# Patient Record
Sex: MALE | Race: WHITE | NOT HISPANIC OR LATINO | Employment: UNEMPLOYED | URBAN - METROPOLITAN AREA
[De-identification: names, ages, dates, MRNs, and addresses within clinical notes are randomized per-mention and may not be internally consistent; named-entity substitution may affect disease eponyms.]

---

## 2019-01-14 ENCOUNTER — OFFICE VISIT (OUTPATIENT)
Dept: GASTROENTEROLOGY | Facility: MEDICAL CENTER | Age: 69
End: 2019-01-14
Payer: OTHER GOVERNMENT

## 2019-01-14 VITALS
TEMPERATURE: 97.8 F | DIASTOLIC BLOOD PRESSURE: 70 MMHG | HEART RATE: 81 BPM | SYSTOLIC BLOOD PRESSURE: 120 MMHG | WEIGHT: 217 LBS

## 2019-01-14 DIAGNOSIS — K21.9 GASTROESOPHAGEAL REFLUX DISEASE WITHOUT ESOPHAGITIS: ICD-10-CM

## 2019-01-14 DIAGNOSIS — Z12.11 COLON CANCER SCREENING: Primary | ICD-10-CM

## 2019-01-14 PROCEDURE — 99204 OFFICE O/P NEW MOD 45 MIN: CPT | Performed by: INTERNAL MEDICINE

## 2019-01-14 RX ORDER — IPRATROPIUM BROMIDE AND ALBUTEROL SULFATE 2.5; .5 MG/3ML; MG/3ML
2 SOLUTION RESPIRATORY (INHALATION) EVERY 4 HOURS PRN
COMMUNITY
Start: 2015-07-01 | End: 2019-06-24 | Stop reason: ALTCHOICE

## 2019-01-14 RX ORDER — MONTELUKAST SODIUM 10 MG/1
10 TABLET ORAL
COMMUNITY
Start: 2009-08-07

## 2019-01-14 RX ORDER — GUAIFENESIN 200 MG/1
200 TABLET ORAL EVERY 4 HOURS PRN
COMMUNITY
Start: 2009-08-07

## 2019-01-14 RX ORDER — LEVOTHYROXINE SODIUM 0.15 MG/1
150 TABLET ORAL
COMMUNITY
Start: 2018-08-14

## 2019-01-14 RX ORDER — RANITIDINE 150 MG/1
150 CAPSULE ORAL
COMMUNITY
Start: 2014-01-10

## 2019-01-14 NOTE — LETTER
January 15, 2019     Claudean Alter, MD  [address]  [place]  [id number]  [id number]    Patient: Sandy Quinonez   YOB: 1950   Date of Visit: 1/14/2019       Dear Dr Jose Tapia: Thank you for referring Sandy Quinonez to me for evaluation  Below are my notes for this consultation  If you have questions, please do not hesitate to call me  I look forward to following your patient along with you  Sincerely,        Tamy Peraza, DO        CC: No Recipients  Alcides Winters  1/14/2019 11:08 AM  Sign at close encounter  [facility] - Outpatient Consultation  Sandy Quinonez 68 y o  male MRN: [id number]  Encounter: [id number]          ASSESSMENT AND PLAN:     Sandy Quinonez is a 68 y o  male here as a referral from Dr Claudean Alter for a colon cancer screening  1  Colon cancer screening  Last colonoscopy was over 10 years ago and it was normal  He is due for another one so I will schedule him for a colonoscopy  Risks and benefits of the procedure were discussed  Risks include but aren't limited to bleeding, perforation, missed lesion, and infection  Patient is agreeable to the procedure  Bowel prep instructions given  2  Gastroesophageal reflux disease without esophagitis  His reflux symptoms are well controlled with Zantac 150 mg daily  He has been on it for several years  Given his long-standing GERD, I will schedule him for an EGD to assess to Orellana's esophagus  Risks and benefits of the procedure were discussed  Risks include but aren't limited to bleeding, perforation, missed lesion, and infection  Patient is agreeable to the procedure     ______________________________________________________________________    HPI:  Sandy Quinonez is a 68 y o  male here as a referral from Dr Claudean Alter for a colon cancer screening  He denies having alarm symptoms today besides one minor instance of rectal bleeding (one drop) 3 weeks ago   He has no family history of color cancer  He takes Zantac 150 mg at night for his heartburn; he has been on it for years  He has never been a smoker and he only drinks occasionally  REVIEW OF SYSTEMS:    CONSTITUTIONAL: Denies any fever, chills, rigors, and weight loss  HEENT: No earache or tinnitus  Denies hearing loss or visual disturbances  CARDIOVASCULAR: No chest pain or palpitations  RESPIRATORY: Denies any cough, hemoptysis, shortness of breath or dyspnea on exertion  GASTROINTESTINAL: As noted in the History of Present Illness  GENITOURINARY: No problems with urination  Denies any hematuria or dysuria  NEUROLOGIC: No dizziness or vertigo, denies headaches  MUSCULOSKELETAL: Denies any muscle or joint pain  SKIN: Denies skin rashes or itching  ENDOCRINE: Denies excessive thirst  Denies intolerance to heat or cold  PSYCHOSOCIAL: Denies depression or anxiety  Denies any recent memory loss  Historical Information   History reviewed  No pertinent past medical history  Past Surgical History:   Procedure Laterality Date    COLONOSCOPY       Social History   History   Alcohol Use    Yes     Comment: occasionally     History   Drug Use No     History   Smoking Status    Never Smoker   Smokeless Tobacco    Never Used     History reviewed  No pertinent family history  Meds/Allergies       Current Outpatient Prescriptions:     fluticasone-salmeterol (ADVAIR) 250-50 mcg/dose inhaler    guaiFENesin 200 MG tablet    ipratropium-albuterol (DUO-NEB) 0 5-2 5 mg/3 mL nebulizer solution    levothyroxine 150 mcg tablet    montelukast (SINGULAIR) 10 mg tablet    ranitidine (ZANTAC) 150 MG capsule    Allergies   Allergen Reactions    Bee Venom Anaphylaxis     Anaphylaxis           Objective     There were no vitals taken for this visit  There is no height or weight on file to calculate BMI  PHYSICAL EXAM:      General Appearance:   Alert, cooperative, no distress   HEENT:   Normocephalic, atraumatic, anicteric      Neck:  Supple, symmetrical, trachea midline   Lungs:   Clear to auscultation bilaterally; no rales, rhonchi or wheezing; respirations unlabored    Heart[de-identified]   Regular rate and rhythm; no murmur, rub, or gallop  Abdomen:   Soft, non-tender, non-distended; normal bowel sounds; no masses, no organomegaly    Genitalia:   Deferred    Rectal:   Deferred    Extremities:  No cyanosis, clubbing or edema    Pulses:  2+ and symmetric    Skin:  No jaundice, rashes, or lesions    Lymph nodes:  No palpable cervical lymphadenopathy        Lab Results:   No visits with results within 1 Day(s) from this visit  Latest known visit with results is:   No results found for any previous visit  Radiology Results:   No results found  Attestation:   By signing my name below, cayla Davis that this documentation has been prepared under the direction and in the presence of Lou Mathis DO  Electronically Signed: David Guillaume  1/14/19       I, Lou Mathis, personally performed the services described in this documentation  All medical record entries made by the papiibpete were at my direction and in my presence  I have reviewed the chart and discharge instructions and agree that the record reflects my personal performance and is accurate and complete     Lou Mathis DO  1/14/19

## 2019-01-14 NOTE — PATIENT INSTRUCTIONS
The patient is scheduled at [place] [address] for a colon/egd on March 25, 2018  Suprep instructions were gone over with by the MA  Plenvu prep is being used  No follow is needed

## 2019-01-14 NOTE — PROGRESS NOTES
[facility] - Outpatient Consultation  Angelique Hinds 68 y o  male MRN: [id number]  Encounter: [id number]          ASSESSMENT AND PLAN:     Angelique Hinds is a 68 y o  male here as a referral from Dr Theresa Macias for a colon cancer screening  1  Colon cancer screening  Last colonoscopy was over 10 years ago and it was normal  He is due for another one so I will schedule him for a colonoscopy  Risks and benefits of the procedure were discussed  Risks include but aren't limited to bleeding, perforation, missed lesion, and infection  Patient is agreeable to the procedure  Bowel prep instructions given  2  Gastroesophageal reflux disease without esophagitis  His reflux symptoms are well controlled with Zantac 150 mg daily  He has been on it for several years  Given his long-standing GERD, I will schedule him for an EGD to assess to Orellana's esophagus  Risks and benefits of the procedure were discussed  Risks include but aren't limited to bleeding, perforation, missed lesion, and infection  Patient is agreeable to the procedure  Follow up annually due to chronic GERD     ______________________________________________________________________    HPI:  Angelique Hinds is a 68 y o  male here as a referral from Dr Theresa Macias for a colon cancer screening  He denies having alarm symptoms today besides one minor instance of rectal bleeding (one drop) 3 weeks ago  He has no family history of color cancer  He takes Zantac 150 mg at night for his heartburn; he has been on it for years  He has never been a smoker and he only drinks occasionally  REVIEW OF SYSTEMS:    CONSTITUTIONAL: Denies any fever, chills, rigors, and weight loss  HEENT: No earache or tinnitus  Denies hearing loss or visual disturbances  CARDIOVASCULAR: No chest pain or palpitations  RESPIRATORY: Denies any cough, hemoptysis, shortness of breath or dyspnea on exertion    GASTROINTESTINAL: As noted in the History of Present Illness  GENITOURINARY: No problems with urination  Denies any hematuria or dysuria  NEUROLOGIC: No dizziness or vertigo, denies headaches  MUSCULOSKELETAL: Denies any muscle or joint pain  SKIN: Denies skin rashes or itching  ENDOCRINE: Denies excessive thirst  Denies intolerance to heat or cold  PSYCHOSOCIAL: Denies depression or anxiety  Denies any recent memory loss  Historical Information   History reviewed  No pertinent past medical history  Past Surgical History:   Procedure Laterality Date    COLONOSCOPY       Social History   History   Alcohol Use    Yes     Comment: occasionally     History   Drug Use No     History   Smoking Status    Never Smoker   Smokeless Tobacco    Never Used     History reviewed  No pertinent family history  Meds/Allergies       Current Outpatient Prescriptions:     fluticasone-salmeterol (ADVAIR) 250-50 mcg/dose inhaler    guaiFENesin 200 MG tablet    ipratropium-albuterol (DUO-NEB) 0 5-2 5 mg/3 mL nebulizer solution    levothyroxine 150 mcg tablet    montelukast (SINGULAIR) 10 mg tablet    ranitidine (ZANTAC) 150 MG capsule    Allergies   Allergen Reactions    Bee Venom Anaphylaxis     Anaphylaxis           Objective     There were no vitals taken for this visit  There is no height or weight on file to calculate BMI  PHYSICAL EXAM:      General Appearance:   Alert, cooperative, no distress   HEENT:   Normocephalic, atraumatic, anicteric      Neck:  Supple, symmetrical, trachea midline   Lungs:   Clear to auscultation bilaterally; no rales, rhonchi or wheezing; respirations unlabored    Heart[de-identified]   Regular rate and rhythm; no murmur, rub, or gallop     Abdomen:   Soft, non-tender, non-distended; normal bowel sounds; no masses, no organomegaly    Genitalia:   Deferred    Rectal:   Deferred    Extremities:  No cyanosis, clubbing or edema    Pulses:  2+ and symmetric    Skin:  No jaundice, rashes, or lesions    Lymph nodes:  No palpable cervical lymphadenopathy        Lab Results:   No visits with results within 1 Day(s) from this visit  Latest known visit with results is:   No results found for any previous visit  Radiology Results:   No results found  Attestation:   By signing my name below, Lucius Rehman, attest that this documentation has been prepared under the direction and in the presence of Tamy Peraza DO  Electronically Signed: Zenaida Lofton  1/14/19       I, Tamy Peraza, personally performed the services described in this documentation  All medical record entries made by the zenaida were at my direction and in my presence  I have reviewed the chart and discharge instructions and agree that the record reflects my personal performance and is accurate and complete     Tamy Peraza DO  1/14/19

## 2019-03-22 ENCOUNTER — ANESTHESIA EVENT (OUTPATIENT)
Dept: GASTROENTEROLOGY | Facility: MEDICAL CENTER | Age: 69
End: 2019-03-22
Payer: OTHER GOVERNMENT

## 2019-03-25 ENCOUNTER — ANESTHESIA (OUTPATIENT)
Dept: GASTROENTEROLOGY | Facility: MEDICAL CENTER | Age: 69
End: 2019-03-25
Payer: OTHER GOVERNMENT

## 2019-03-25 ENCOUNTER — HOSPITAL ENCOUNTER (OUTPATIENT)
Facility: MEDICAL CENTER | Age: 69
Setting detail: OUTPATIENT SURGERY
Discharge: HOME | End: 2019-03-25
Attending: INTERNAL MEDICINE | Admitting: INTERNAL MEDICINE
Payer: OTHER GOVERNMENT

## 2019-03-25 VITALS
DIASTOLIC BLOOD PRESSURE: 73 MMHG | HEART RATE: 81 BPM | SYSTOLIC BLOOD PRESSURE: 109 MMHG | OXYGEN SATURATION: 97 % | TEMPERATURE: 98.7 F | HEIGHT: 72 IN | WEIGHT: 217 LBS | RESPIRATION RATE: 16 BRPM | BODY MASS INDEX: 29.39 KG/M2

## 2019-03-25 DIAGNOSIS — Z12.11 COLON CANCER SCREENING: ICD-10-CM

## 2019-03-25 DIAGNOSIS — K21.9 GASTROESOPHAGEAL REFLUX DISEASE WITHOUT ESOPHAGITIS: ICD-10-CM

## 2019-03-25 DIAGNOSIS — K22.10 EROSIVE ESOPHAGITIS: Primary | ICD-10-CM

## 2019-03-25 PROCEDURE — 43239 EGD BIOPSY SINGLE/MULTIPLE: CPT | Performed by: INTERNAL MEDICINE

## 2019-03-25 PROCEDURE — 88305 TISSUE EXAM BY PATHOLOGIST: CPT | Performed by: PATHOLOGY

## 2019-03-25 PROCEDURE — 45385 COLONOSCOPY W/LESION REMOVAL: CPT | Performed by: INTERNAL MEDICINE

## 2019-03-25 RX ORDER — SODIUM CHLORIDE 9 MG/ML
125 INJECTION, SOLUTION INTRAVENOUS CONTINUOUS
Status: DISCONTINUED | OUTPATIENT
Start: 2019-03-25 | End: 2019-03-25 | Stop reason: HOSPADM

## 2019-03-25 RX ORDER — OMEPRAZOLE 40 MG/1
40 CAPSULE, DELAYED RELEASE ORAL
Qty: 60 CAPSULE | Refills: 3 | Status: SHIPPED | OUTPATIENT
Start: 2019-03-25

## 2019-03-25 RX ORDER — PROPOFOL 10 MG/ML
INJECTION, EMULSION INTRAVENOUS AS NEEDED
Status: DISCONTINUED | OUTPATIENT
Start: 2019-03-25 | End: 2019-03-25 | Stop reason: SURG

## 2019-03-25 RX ADMIN — PROPOFOL 100 MG: 10 INJECTION, EMULSION INTRAVENOUS at 09:45

## 2019-03-25 RX ADMIN — PROPOFOL 100 MG: 10 INJECTION, EMULSION INTRAVENOUS at 09:34

## 2019-03-25 RX ADMIN — SODIUM CHLORIDE 125 ML/HR: 0.9 INJECTION, SOLUTION INTRAVENOUS at 09:05

## 2019-03-25 RX ADMIN — PROPOFOL 100 MG: 10 INJECTION, EMULSION INTRAVENOUS at 09:53

## 2019-03-25 RX ADMIN — PROPOFOL 100 MG: 10 INJECTION, EMULSION INTRAVENOUS at 09:41

## 2019-03-25 RX ADMIN — PROPOFOL 100 MG: 10 INJECTION, EMULSION INTRAVENOUS at 09:37

## 2019-03-25 RX ADMIN — PROPOFOL 100 MG: 10 INJECTION, EMULSION INTRAVENOUS at 10:00

## 2019-03-25 NOTE — ANESTHESIA PREPROCEDURE EVALUATION
Review of Systems/Medical History  Patient summary reviewed  Chart reviewed      Cardiovascular   Pulmonary  Negative pulmonary ROS Smoker ex-smoker  , Asthma ,        GI/Hepatic    GERD well controlled,        Negative  ROS        Endo/Other  Negative endo/other ROS History of thyroid disease , hypothyroidism,      GYN       Hematology  Negative hematology ROS      Musculoskeletal  Negative musculoskeletal ROS        Neurology  Negative neurology ROS      Psychology   Negative psychology ROS              Physical Exam    Airway    Mallampati score: II  TM Distance: <3 FB  Neck ROM: full     Dental       Cardiovascular  Rhythm: regular, Rate: normal,     Pulmonary  Breath sounds clear to auscultation,     Other Findings        Anesthesia Plan  ASA Score- 2     Anesthesia Type- IV sedation with anesthesia with ASA Monitors  Additional Monitors:   Airway Plan:         Plan Factors- Patient instructed to abstain from smoking on day of procedure  Patient did not smoke on day of surgery  Induction- intravenous  Postoperative Plan-     Informed Consent- Anesthetic plan and risks discussed with patient

## 2019-03-25 NOTE — DISCHARGE INSTRUCTIONS
Upper Endoscopy   WHAT YOU NEED TO KNOW:   An upper endoscopy is also called an upper gastrointestinal (GI) endoscopy, or an esophagogastroduodenoscopy (EGD)  You may feel bloated, gassy, or have some abdominal discomfort after your procedure  Your throat may be sore for 24 to 36 hours  You may burp or pass gas from air that is still inside your body  DISCHARGE INSTRUCTIONS:   Call 911 if:   · You have sudden chest pain or trouble breathing  Seek care immediately if:   · You feel dizzy or faint  · You have trouble swallowing  · You have severe throat pain  · Your bowel movements are very dark or black  · Your abdomen is hard and firm and you have severe pain  · You vomit blood  Contact your healthcare provider if:   · You feel full or bloated and cannot burp or pass gas  · You have not had a bowel movement for 3 days after your procedure  · You have neck pain  · You have a fever or chills  · You have nausea or are vomiting  · You have a rash or hives  · You have questions or concerns about your endoscopy  Relieve a sore throat:  Suck on throat lozenges or crushed ice  Gargle with a small amount of warm salt water  Mix 1 teaspoon of salt and 1 cup of warm water to make salt water  Relieve gas and discomfort from bloating:  Lie on your right side with a heating pad on your abdomen  Take short walks to help pass gas  Eat small meals until bloating is relieved  Rest after your procedure:  Do not drive or make important decisions until the day after your procedure  Return to your normal activity as directed  You can usually return to work the day after your procedure  Follow up with your healthcare provider as directed:  Write down your questions so you remember to ask them during your visits  © 2017 [facility] Information is for End User's use only and may not be sold, redistributed or otherwise used for commercial purposes   All illustrations and images included in CareNotes® are the copyrighted property of [employer] M [employer]  or Mika García  The above information is an  only  It is not intended as medical advice for individual conditions or treatments  Talk to your doctor, nurse or pharmacist before following any medical regimen to see if it is safe and effective for you  Colonoscopy   WHAT YOU NEED TO KNOW:   A colonoscopy is a procedure to examine the inside of your colon (intestine) with a scope  Polyps or tissue growths may have been removed during your colonoscopy  It is normal to feel bloated and to have some abdominal discomfort  You should be passing gas  If you have hemorrhoids or you had polyps removed, you may have a small amount of bleeding  DISCHARGE INSTRUCTIONS:   Seek care immediately if:   · You have a large amount of bright red blood in your bowel movements  · Your abdomen is hard and firm and you have severe pain  · You have sudden trouble breathing  Contact your healthcare provider if:   · You develop a rash or hives  · You have a fever within 24 hours of your procedure  · You have not had a bowel movement for 3 days after your procedure  · You have questions or concerns about your condition or care  Activity:   · Do not lift, strain, or run  for 3 days after your procedure  · Rest after your procedure  You have been given medicine to relax you  Do not  drive or make important decisions until the day after your procedure  Return to your normal activity as directed  · Relieve gas and discomfort from bloating  by lying on your right side with a heating pad on your abdomen  You may need to take short walks to help the gas move out  Eat small meals until bloating is relieved  If you had polyps removed: For 7 days after your procedure:  · Do not  take aspirin  · Do not  go on long car rides  Help prevent constipation:   · Eat a variety of healthy foods    Healthy foods include fruit, vegetables, whole-grain breads, low-fat dairy products, beans, lean meat, and fish  Ask if you need to be on a special diet  Your healthcare provider may recommend that you eat high-fiber foods such as cooked beans  Fiber helps you have regular bowel movements  · Drink liquids as directed  Adults should drink between 9 and 13 eight-ounce cups of liquid every day  Ask what amount is best for you  For most people, good liquids to drink are water, juice, and milk  · Exercise as directed  Talk to your healthcare provider about the best exercise plan for you  Exercise can help prevent constipation, decrease your blood pressure and improve your health  Follow up with your healthcare provider as directed:  Write down your questions so you remember to ask them during your visits  © 2017 [facility] Information is for End User's use only and may not be sold, redistributed or otherwise used for commercial purposes  All illustrations and images included in CareNotes® are the copyrighted property of A [employer] A M , Inc  or Mika García  The above information is an  only  It is not intended as medical advice for individual conditions or treatments  Talk to your doctor, nurse or pharmacist before following any medical regimen to see if it is safe and effective for you  Corrosive Esophagitis   WHAT YOU NEED TO KNOW:   Corrosive esophagitis is a condition where your esophagus is damaged by harmful substances  The damage may cause inflammation, ulcers, or scarring  DISCHARGE INSTRUCTIONS:   Medicines:   · Antibiotics:  Antibiotics help treat or prevent an infection in your esophagus  · Steroids: These help decrease inflammation  · Stomach acid medicine: These help decrease irritation from stomach acids  · Antiulcer medicine: These help decrease irritation from stomach acids   They may help increase the protective lining of the esophagus to help it heal     · Take your medicine as directed  Contact your healthcare provider if you think your medicine is not helping or if you have side effects  Tell him or her if you are allergic to any medicine  Keep a list of the medicines, vitamins, and herbs you take  Include the amounts, and when and why you take them  Bring the list or the pill bottles to follow-up visits  Carry your medicine list with you in case of an emergency  Follow up with your healthcare provider as directed: You may need to have another endoscopy to make sure your esophagus is healing  Write down your questions so you remember to ask them during your visits  Nutrition to help your esophagus heal:  You may need to eat foods that are soft and easy to swallow  Some examples are applesauce, bananas, cooked cereal, cottage cheese, eggs, and yogurt  Prevent corrosive esophagitis:   · Sit or stand when you take your medicine:  Do not lie down after you take your pills  Stay in an upright position for 10 to 15 minutes after you take your pills  · Store harmful chemicals in a safe location:  Label bottles with harmful substances, and keep them out of the reach of children  · Ask for other ways to take your medicine: If you have a narrow esophagus, ask if you can take your medicine in liquid form  Ask if you can crush the pill and mix it with liquid to drink  If you must swallow pills, take them 1 at a time  Take each one with at least 4 ounces of liquid  Contact your healthcare provider if:   · You have a fever  · You have pain that does not decrease or go away after you take your pain medicine  · You vomit and cannot keep food or liquids down  · Your stomach feels very full, and you cannot burp or vomit  · You have questions or concerns about your condition or care  Seek care immediately or call 911 if:   · You feel like food or medicine is stuck in your esophagus and it does not go down when you drink water       · Your vomit has blood in it or looks like coffee grounds  · You have sudden chest pain and shortness of breath  · You have black or bloody bowel movements  · Your symptoms are getting worse  © 2017 [address] [facility] Information is for End User's use only and may not be sold, redistributed or otherwise used for commercial purposes  All illustrations and images included in CareNotes® are the copyrighted property of [employer] M [employer] Inc  or Mika García  The above information is an  only  It is not intended as medical advice for individual conditions or treatments  Talk to your doctor, nurse or pharmacist before following any medical regimen to see if it is safe and effective for you  Gastritis   WHAT YOU NEED TO KNOW:   Gastritis is inflammation or irritation of the lining of your stomach  DISCHARGE INSTRUCTIONS:   Call 911 for any of the following:   · You develop chest pain or shortness of breath  Seek care immediately if:   · You vomit blood  · You have black or bloody bowel movements  · You have severe stomach or back pain  Contact your healthcare provider if:   · You have a fever  · You have new or worsening symptoms, even after treatment  · You have questions or concerns about your condition or care  Medicines:   · Medicines  may be given to help treat a bacterial infection or decrease stomach acid  · Take your medicine as directed  Contact your healthcare provider if you think your medicine is not helping or if you have side effects  Tell him or her if you are allergic to any medicine  Keep a list of the medicines, vitamins, and herbs you take  Include the amounts, and when and why you take them  Bring the list or the pill bottles to follow-up visits  Carry your medicine list with you in case of an emergency  Manage or prevent gastritis:   · Do not smoke    Nicotine and other chemicals in cigarettes and cigars can make your symptoms worse and cause lung damage  Ask your healthcare provider for information if you currently smoke and need help to quit  E-cigarettes or smokeless tobacco still contain nicotine  Talk to your healthcare provider before you use these products  · Do not drink alcohol  Alcohol can prevent healing and make your gastritis worse  Talk to your healthcare provider if you need help to stop drinking  · Do not take NSAIDs or aspirin unless directed  These and similar medicines can cause irritation  If your healthcare provider says it is okay to take NSAIDs, take them with food  · Do not eat foods that cause irritation  Foods such as oranges and salsa can cause burning or pain  Eat a variety of healthy foods  Examples include fruits (not citrus), vegetables, low-fat dairy products, beans, whole-grain breads, and lean meats and fish  Try to eat small meals, and drink water with your meals  Do not eat for at least 3 hours before you go to bed  · Find ways to relax and decrease stress  Stress can increase stomach acid and make gastritis worse  Activities such as yoga, meditation, or listening to music can help you relax  Spend time with friends, or do things you enjoy  Follow up with your healthcare provider as directed: You may need ongoing tests or treatment, or referral to a gastroenterologist  Write down your questions so you remember to ask them during your visits  © 2017 [address]  Information is for End User's use only and may not be sold, redistributed or otherwise used for commercial purposes  All illustrations and images included in CareNotes® are the copyrighted property of [employer]  or Mika García  The above information is an  only  It is not intended as medical advice for individual conditions or treatments  Talk to your doctor, nurse or pharmacist before following any medical regimen to see if it is safe and effective for you        Hiatal Hernia   WHAT YOU NEED TO KNOW:   What is a hiatal hernia? A hiatal hernia is a condition that causes part of your stomach to bulge through the hiatus (small opening) in your diaphragm  The part of the stomach may move up and down, or it may get trapped above the diaphragm  What increases my risk for a hiatal hernia? The exact cause of a hiatal hernia is not known  You may have been born with a large hiatus  The following may increase your risk of a hiatal hernia:  · Obesity    · Older age    · Medical conditions such as diverticulosis or esophagitis    · Previous surgery of the esophagus or stomach or trauma such as from a motor vehicle accident  What are the types of hiatal hernia? · Type I (sliding hiatal hernia): A portion of the stomach slides in and out of the hiatus  This type is the most common and usually causes gastroesophageal reflux disease (GERD)  GERD occurs when the esophageal sphincter does not close properly and causes acid reflux  The esophageal sphincter is the lower muscle of the esophagus  · Type II (paraesophageal hiatal hernia):  Type II hiatal hernia forms when a part of the stomach squeezes through the hiatus and lies next to the esophagus  · Type III (combined):  Type III hiatal hernia is a combination of a sliding and a paraesophageal hiatal hernia  · Type IV (complex paraesophageal hiatal hernia): The whole stomach, the small and large bowels, spleen, pancreas, or liver is pushed up into the chest   What are the signs and symptoms of a hiatal hernia? The most common symptom is heartburn  This usually occurs after meals and spreads to your neck, jaw, or shoulder   You may have no signs or symptoms, or you may have any of the following:  · Abdominal pain, especially in the area just above your navel    · Bitter or acid taste in your mouth    · Trouble swallowing    · Coughing or hoarseness    · Chest pain or shortness of breath that occurs after eating    · Frequent burping or hiccups    · Uncomfortable feeling of fullness after eating  How is a hiatal hernia diagnosed? · An upper GI series test  includes x-rays of your esophagus, stomach, and your small intestines  It is also called a barium swallow test  You will be given barium (a chalky liquid) to drink before the pictures are taken  This liquid helps your stomach and intestines show up better on the x-rays  An upper GI series can show if you have an ulcer, a blocked intestine, or other problems  · An endoscopy  uses a scope to see the inside of your digestive tract  A scope is a long, bendable tube with a light on the end of it  A camera may be hooked to the scope to take pictures  How is a hiatal hernia treated? Treatment depends on the type of hiatal hernia you have and on your symptoms  You may not need any treatment  You may need any of the following:  · Medicines  may be given to relieve heartburn symptoms  These medicines help to decrease or block stomach acid  You may also be given medicines that help to tighten the esophageal sphincter  · Surgery  may be done when medicines cannot control your symptoms, or other problems are present  Your healthcare provider may also suggest surgery depending on the type of hernia you have  Your healthcare provider can put your stomach back into its normal location  He may make the hiatus (hole) smaller and anchor your stomach in your abdomen  Fundoplication is a surgery that wraps the upper part of the stomach around the esophageal sphincter to strengthen it  How can I manage symptoms? The following nutrition and lifestyle changes may be recommended to relieve symptoms of heartburn  · Avoid foods that make your symptoms worse  These may include spicy foods, fruit juices, alcohol, caffeine, chocolate, and mint  · Eat several small meals during the day  Small meals give your stomach less food to digest     · Avoid lying down and bending forward after you eat    Do not eat meals 2 to 3 hours before bedtime  This decreases your risk for reflux  · Maintain a healthy weight  If you are overweight, weight loss may help relieve your symptoms  · Sleep with your head elevated  at least 6 inches  · Do not smoke  Smoking can increase your symptoms of heartburn  When should I seek immediate care? · You have severe abdominal pain  · You try to vomit but nothing comes out (retching)  · You have severe chest pain and sudden trouble breathing  · Your bowel movements are black or bloody  · Your vomit looks like coffee grounds or has blood in it  When should I contact my healthcare provider? · Your symptoms are getting worse  · You have nausea, and you are vomiting  · You are losing weight without trying  · You have questions or concerns about your condition or care  CARE AGREEMENT:   You have the right to help plan your care  Learn about your health condition and how it may be treated  Discuss treatment options with your caregivers to decide what care you want to receive  You always have the right to refuse treatment  The above information is an  only  It is not intended as medical advice for individual conditions or treatments  Talk to your doctor, nurse or pharmacist before following any medical regimen to see if it is safe and effective for you  © 2017 [address]  Information is for End User's use only and may not be sold, redistributed or otherwise used for commercial purposes  All illustrations and images included in CareNotes® are the copyrighted property of A D A M , Inc  or Mika García  Colorectal Polyps   WHAT YOU NEED TO KNOW:   Colorectal polyps are small growths of tissue in the lining of the colon and rectum  Most polyps are hyperplastic polyps and are usually benign (noncancerous)  Certain types of polyps, called adenomatous polyps, may turn into cancer          DISCHARGE INSTRUCTIONS:   Follow up with your healthcare provider or gastroenterologist as directed: You may need to return for more tests, such as another colonoscopy  Write down your questions so you remember to ask them during your visits  Reduce your risk for colorectal polyps:   · Eat a variety of healthy foods:  Healthy foods include fruit, vegetables, whole-grain breads, low-fat dairy products, beans, lean meat, and fish  Ask if you need to be on a special diet  · Maintain a healthy weight:  Ask your healthcare provider if you need to lose weight and how much you need to lose  Ask for help with a weight loss program     · Exercise:  Begin to exercise slowly and do more as you get stronger  Talk with your healthcare provider before you start an exercise program      · Limit alcohol:  Your risk for polyps increases the more you drink  · Do not smoke: If you smoke, it is never too late to quit  Ask for information about how to stop  For support and more information:   · Staci Mcnulty ([facility])  [address] [address]  Phone: [phone]  Web Address: www digestive  niddk nih gov  Contact your healthcare provider or gastroenterologist if:   · You have a fever  · You have chills, a cough, or feel weak and achy  · You have abdominal pain that does not go away or gets worse after you take medicine  · Your abdomen is swollen  · You are losing weight without trying  · You have questions or concerns about your condition or care  Seek care immediately or call 911 if:   · You have sudden shortness of breath  · You have a fast heart rate, fast breathing, or are too dizzy to stand up  · You have severe abdominal pain  · You see blood in your bowel movement  © 2017 [address] Information is for End User's use only and may not be sold, redistributed or otherwise used for commercial purposes   All illustrations and images included in CareNotes® are the copyrighted property of [employer] M , Inc  or Mika García  The above information is an  only  It is not intended as medical advice for individual conditions or treatments  Talk to your doctor, nurse or pharmacist before following any medical regimen to see if it is safe and effective for you
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
## 2019-03-25 NOTE — DISCHARGE INSTR - AVS FIRST PAGE
OPERATIVE REPORT  PATIENT NAME: Shahene Arias    :  1950  MRN: [id number]  Pt Location: [facility] GI ROOM 01    SURGERY DATE: 3/25/2019    Surgeon(s) and Role:     Jenny Little,  - Primary    Preop Diagnosis:  Colon cancer screening [Z12 11]  Gastroesophageal reflux disease without esophagitis [K21 9]    Post-Op Diagnosis Codes:     * Colon cancer screening [Z12 11]     * Gastroesophageal reflux disease without esophagitis [K21 9]    Procedure(s) (LRB):  COLONOSCOPY (N/A)  ESOPHAGOGASTRODUODENOSCOPY (EGD) (N/A)    Specimen(s):  ID Type Source Tests Collected by Time Destination   1 : Gastric biopsy r/o h pylori Gastritis  Tissue Stomach TISSUE EXAM Jasmyne Bhatia, DO 3/25/2019 4039    2 : Ascending colon polyp  Tissue Polyp, Colorectal TISSUE EXAM Refugio Rasmussen, DO 3/25/2019 0949    3 : Sigmoid colon polyp  Tissue Polyp, Colorectal TISSUE EXAM Refugio Rasmussen, DO 3/25/2019 1005        Estimated Blood Loss:   Minimal    Drains:  No LDAs found     Anesthesia Type:   Choice    Operative Indications:  Colon cancer screening [Z12 11]  Gastroesophageal reflux disease without esophagitis [K21 9]      Operative Findings:    ESOPHAGOGASTRODUODENOSCOPY    PROCEDURE: EGD    SEDATION: Monitored anesthesia care, check anesthesia records    ASA Class: 2    INDICATIONS:  Longstanding GERD    CONSENT:  Informed consent was obtained for the procedure, including sedation after explaining the risks and benefits of the procedure  Risks including but not limited to bleeding, perforation, infection, and missed lesion  PREPARATION:   Telemetry, pulse oximetry, blood pressure were monitored throughout the procedure  Patient was identified by myself both verbally and by visual inspection of ID band  DESCRIPTION:   Patient was placed in the left lateral decubitus position and was sedated with the above medication   The gastroscope was introduced in to the oropharynx and the esophagus was intubated under direct visualization  Scope was passed down the esophagus up to 2nd part of the duodenum  A careful inspection was made as the gastroscope was withdrawn, including a retroflexed view of the stomach; findings and interventions are described below  FINDINGS:      #1  Esophagus- large hiatal hernia, class A erosive esophagitis in the distal esophagus    #2  Stomach- mild gastritis, biopsied with cold biopsy forceps     #3  Duodenum- normal first and second part of the duodenum           IMPRESSIONS:      Gastritis  Biopsy  Erosive esophagitis  Large hiatal hernia  RECOMMENDATIONS:     Will need acid blocking medication 40 mg twice daily for 12 weeks and then repeat EGD  Proceed with colonoscopy now  COMPLICATIONS:  None; patient tolerated the procedure well  SPECIMENS:    ID Type Source Tests Collected by Time Destination   1 : Gastric biopsy r/o h pylori Gastritis  Tissue Stomach TISSUE EXAM Jasmyne Bhatia, DO 3/25/2019 1434    2 : Ascending colon polyp  Tissue Polyp, Colorectal TISSUE EXAM Hermann Garcia, DO 3/25/2019 7352    3 : Sigmoid colon polyp  Tissue Polyp, Colorectal TISSUE EXAM Hermann Garcia, DO 3/25/2019 1005        ESTIMATED BLOOD LOSS:  Minimal      Colonoscopy Procedure Note    Procedure: Colonoscopy    Sedation: Monitored anesthesia care, check anesthesia records      ASA Class: 2    INDICATIONS:  Colon cancer screening    POST-OP DIAGNOSIS: See the impression below    Procedure Details     Prior colonoscopy: 10 years ago  Informed consent was obtained for the procedure, including sedation  Risks of perforation, hemorrhage, adverse drug reaction and aspiration were discussed  The patient was placed in the left lateral decubitus position    Based on the pre-procedure assessment, including review of the patient's medical history, medications, allergies, and review of systems, he had been deemed to be an appropriate candidate for conscious sedation; he was therefore sedated with the medications listed below  The patient was monitored continuously with telemetry, pulse oximetry, blood pressure monitoring, and direct observations  A rectal examination was performed  The colonoscope was inserted into the rectum and advanced under direct vision to the cecum, which was identified by the ileocecal valve and appendiceal orifice  The quality of the colonic preparation was good  A careful inspection was made as the colonoscope was withdrawn, including a retroflexed view of the rectum; findings and interventions are described below  Findings:  One 6 mm sessile polyp in ascending colon  This was removed with cold snare polypectomy  One 6 mm sessile polyp in the sigmoid colon  This was removed with cold snare polypectomy  Complications: None; patient tolerated the procedure well  Impression:    One 6 mm sessile polyp in ascending colon  This was removed with cold snare polypectomy  One 6 mm sessile polyp in the sigmoid colon  This was removed with cold snare polypectomy  Recommendations:  Await biopsy results  Repeat colonoscopy based on pathology but likely 5 years if polyps are adenomas  COMPLICATIONS:  None; patient tolerated the procedure well      SPECIMENS:    ID Type Source Tests Collected by Time Destination   1 : Gastric biopsy r/o h pylori Gastritis  Tissue Stomach TISSUE EXAM Hermann Garcia DO 3/25/2019 7600    2 : Ascending colon polyp  Tissue Polyp, Colorectal TISSUE EXAM Hermann Garcia DO 3/25/2019 0949    3 : Sigmoid colon polyp  Tissue Polyp, Colorectal TISSUE EXAM Hermann Garcia DO 3/25/2019 1005        ESTIMATED BLOOD LOSS:  Minimal    SIGNATURE: Hermann Garcia DO  DATE: March 25, 2019  TIME: 10:11 AM

## 2019-03-25 NOTE — OP NOTE
OPERATIVE REPORT  PATIENT NAME: Alana Mendieta    :  1950  MRN: [id number]  Pt Location: [facility] GI ROOM 01    SURGERY DATE: 3/25/2019    Surgeon(s) and Role:     Henna Seay,  - Primary    Preop Diagnosis:  Colon cancer screening [Z12 11]  Gastroesophageal reflux disease without esophagitis [K21 9]    Post-Op Diagnosis Codes:     * Colon cancer screening [Z12 11]     * Gastroesophageal reflux disease without esophagitis [K21 9]    Procedure(s) (LRB):  COLONOSCOPY (N/A)  ESOPHAGOGASTRODUODENOSCOPY (EGD) (N/A)    Specimen(s):  ID Type Source Tests Collected by Time Destination   1 : Gastric biopsy r/o h pylori Gastritis  Tissue Stomach TISSUE EXAM Jasmyne Bhatia, DO 3/25/2019 6622    2 : Ascending colon polyp  Tissue Polyp, Colorectal TISSUE EXAM Kimo Kim, DO 3/25/2019 0949    3 : Sigmoid colon polyp  Tissue Polyp, Colorectal TISSUE EXAM Kimo Kim, DO 3/25/2019 1005        Estimated Blood Loss:   Minimal    Drains:  No LDAs found     Anesthesia Type:   Choice    Operative Indications:  Colon cancer screening [Z12 11]  Gastroesophageal reflux disease without esophagitis [K21 9]      Operative Findings:    ESOPHAGOGASTRODUODENOSCOPY    PROCEDURE: EGD    SEDATION: Monitored anesthesia care, check anesthesia records    ASA Class: 2    INDICATIONS:  Longstanding GERD    CONSENT:  Informed consent was obtained for the procedure, including sedation after explaining the risks and benefits of the procedure  Risks including but not limited to bleeding, perforation, infection, and missed lesion  PREPARATION:   Telemetry, pulse oximetry, blood pressure were monitored throughout the procedure  Patient was identified by myself both verbally and by visual inspection of ID band  DESCRIPTION:   Patient was placed in the left lateral decubitus position and was sedated with the above medication   The gastroscope was introduced in to the oropharynx and the esophagus was intubated under direct visualization  Scope was passed down the esophagus up to 2nd part of the duodenum  A careful inspection was made as the gastroscope was withdrawn, including a retroflexed view of the stomach; findings and interventions are described below  FINDINGS:      #1  Esophagus- large hiatal hernia, class A erosive esophagitis in the distal esophagus    #2  Stomach- mild gastritis, biopsied with cold biopsy forceps     #3  Duodenum- normal first and second part of the duodenum           IMPRESSIONS:      Gastritis  Biopsy  Erosive esophagitis  Large hiatal hernia  RECOMMENDATIONS:     Will need acid blocking medication 40 mg twice daily for 12 weeks and then repeat EGD  Proceed with colonoscopy now  COMPLICATIONS:  None; patient tolerated the procedure well  SPECIMENS:    ID Type Source Tests Collected by Time Destination   1 : Gastric biopsy r/o h pylori Gastritis  Tissue Stomach TISSUE EXAM Jasmyne Bhatia, DO 3/25/2019 9537    2 : Ascending colon polyp  Tissue Polyp, Colorectal TISSUE EXAM Darby Maxwell, DO 3/25/2019 9613    3 : Sigmoid colon polyp  Tissue Polyp, Colorectal TISSUE EXAM Darby Maxwell, DO 3/25/2019 1005        ESTIMATED BLOOD LOSS:  Minimal      Colonoscopy Procedure Note    Procedure: Colonoscopy    Sedation: Monitored anesthesia care, check anesthesia records      ASA Class: 2    INDICATIONS:  Colon cancer screening    POST-OP DIAGNOSIS: See the impression below    Procedure Details     Prior colonoscopy: 10 years ago  Informed consent was obtained for the procedure, including sedation  Risks of perforation, hemorrhage, adverse drug reaction and aspiration were discussed  The patient was placed in the left lateral decubitus position    Based on the pre-procedure assessment, including review of the patient's medical history, medications, allergies, and review of systems, he had been deemed to be an appropriate candidate for conscious sedation; he was therefore sedated with the medications listed below  The patient was monitored continuously with telemetry, pulse oximetry, blood pressure monitoring, and direct observations  A rectal examination was performed  The colonoscope was inserted into the rectum and advanced under direct vision to the cecum, which was identified by the ileocecal valve and appendiceal orifice  The quality of the colonic preparation was good  A careful inspection was made as the colonoscope was withdrawn, including a retroflexed view of the rectum; findings and interventions are described below  Findings:  One 6 mm sessile polyp in ascending colon  This was removed with cold snare polypectomy  One 6 mm sessile polyp in the sigmoid colon  This was removed with cold snare polypectomy  Complications: None; patient tolerated the procedure well  Impression:    One 6 mm sessile polyp in ascending colon  This was removed with cold snare polypectomy  One 6 mm sessile polyp in the sigmoid colon  This was removed with cold snare polypectomy  Recommendations:  Await biopsy results  Repeat colonoscopy based on pathology but likely 5 years if polyps are adenomas  COMPLICATIONS:  None; patient tolerated the procedure well      SPECIMENS:    ID Type Source Tests Collected by Time Destination   1 : Gastric biopsy r/o h pylori Gastritis  Tissue Stomach TISSUE EXAM German Laura DO 3/25/2019 5681    2 : Ascending colon polyp  Tissue Polyp, Colorectal TISSUE EXAM German Laura DO 3/25/2019 0949    3 : Sigmoid colon polyp  Tissue Polyp, Colorectal TISSUE EXAM German Laura DO 3/25/2019 1005        ESTIMATED BLOOD LOSS:  Minimal    SIGNATURE: German Laura DO  DATE: March 25, 2019  TIME: 10:11 AM

## 2019-04-02 ENCOUNTER — TELEPHONE (OUTPATIENT)
Dept: GASTROENTEROLOGY | Facility: CLINIC | Age: 69
End: 2019-04-02

## 2019-04-02 PROBLEM — K22.10 EROSIVE ESOPHAGITIS: Status: ACTIVE | Noted: 2019-04-02

## 2019-06-24 ENCOUNTER — ANESTHESIA (OUTPATIENT)
Dept: GASTROENTEROLOGY | Facility: HOSPITAL | Age: 69
End: 2019-06-24

## 2019-06-24 ENCOUNTER — HOSPITAL ENCOUNTER (OUTPATIENT)
Dept: GASTROENTEROLOGY | Facility: HOSPITAL | Age: 69
Setting detail: OUTPATIENT SURGERY
Discharge: HOME | End: 2019-06-24
Attending: INTERNAL MEDICINE | Admitting: INTERNAL MEDICINE
Payer: MEDICARE

## 2019-06-24 ENCOUNTER — ANESTHESIA EVENT (OUTPATIENT)
Dept: GASTROENTEROLOGY | Facility: HOSPITAL | Age: 69
End: 2019-06-24

## 2019-06-24 VITALS
HEIGHT: 72 IN | OXYGEN SATURATION: 94 % | RESPIRATION RATE: 16 BRPM | WEIGHT: 217 LBS | TEMPERATURE: 97 F | BODY MASS INDEX: 29.39 KG/M2 | HEART RATE: 80 BPM | SYSTOLIC BLOOD PRESSURE: 96 MMHG | DIASTOLIC BLOOD PRESSURE: 62 MMHG

## 2019-06-24 DIAGNOSIS — K22.10 EROSIVE ESOPHAGITIS: ICD-10-CM

## 2019-06-24 PROBLEM — D12.6 TUBULAR ADENOMA OF COLON: Status: ACTIVE | Noted: 2019-06-24

## 2019-06-24 PROCEDURE — 88305 TISSUE EXAM BY PATHOLOGIST: CPT | Performed by: PATHOLOGY

## 2019-06-24 PROCEDURE — 43239 EGD BIOPSY SINGLE/MULTIPLE: CPT | Performed by: INTERNAL MEDICINE

## 2019-06-24 RX ORDER — METHOCARBAMOL 500 MG/1
500 TABLET, FILM COATED ORAL AS NEEDED
COMMUNITY

## 2019-06-24 RX ORDER — SODIUM CHLORIDE 9 MG/ML
INJECTION, SOLUTION INTRAVENOUS CONTINUOUS PRN
Status: DISCONTINUED | OUTPATIENT
Start: 2019-06-24 | End: 2019-06-24 | Stop reason: SURG

## 2019-06-24 RX ORDER — CETIRIZINE HYDROCHLORIDE 10 MG/1
10 TABLET ORAL DAILY
COMMUNITY

## 2019-06-24 RX ORDER — PROPOFOL 10 MG/ML
INJECTION, EMULSION INTRAVENOUS AS NEEDED
Status: DISCONTINUED | OUTPATIENT
Start: 2019-06-24 | End: 2019-06-24 | Stop reason: SURG

## 2019-06-24 RX ORDER — SODIUM CHLORIDE 9 MG/ML
100 INJECTION, SOLUTION INTRAVENOUS CONTINUOUS
Status: DISCONTINUED | OUTPATIENT
Start: 2019-06-24 | End: 2019-06-28 | Stop reason: HOSPADM

## 2019-06-24 RX ADMIN — PROPOFOL 150 MG: 10 INJECTION, EMULSION INTRAVENOUS at 14:56

## 2019-06-24 RX ADMIN — PROPOFOL 50 MG: 10 INJECTION, EMULSION INTRAVENOUS at 15:00

## 2019-06-24 RX ADMIN — SODIUM CHLORIDE 100 ML/HR: 0.9 INJECTION, SOLUTION INTRAVENOUS at 14:45

## 2019-06-24 RX ADMIN — SODIUM CHLORIDE: 9 INJECTION, SOLUTION INTRAVENOUS at 14:52

## 2019-06-24 RX ADMIN — PROPOFOL 30 MG: 10 INJECTION, EMULSION INTRAVENOUS at 15:04

## 2019-06-26 ENCOUNTER — TELEPHONE (OUTPATIENT)
Dept: GASTROENTEROLOGY | Facility: MEDICAL CENTER | Age: 69
End: 2019-06-26

## 2022-05-18 ENCOUNTER — OFFICE VISIT (OUTPATIENT)
Dept: GASTROENTEROLOGY | Facility: MEDICAL CENTER | Age: 72
End: 2022-05-18
Payer: MEDICARE

## 2022-05-18 VITALS
DIASTOLIC BLOOD PRESSURE: 78 MMHG | BODY MASS INDEX: 29.54 KG/M2 | SYSTOLIC BLOOD PRESSURE: 136 MMHG | TEMPERATURE: 98.6 F | WEIGHT: 217.8 LBS | HEART RATE: 67 BPM

## 2022-05-18 DIAGNOSIS — K21.9 GASTROESOPHAGEAL REFLUX DISEASE WITHOUT ESOPHAGITIS: ICD-10-CM

## 2022-05-18 DIAGNOSIS — K22.70 BARRETT'S ESOPHAGUS WITHOUT DYSPLASIA: Primary | ICD-10-CM

## 2022-05-18 PROCEDURE — 99203 OFFICE O/P NEW LOW 30 MIN: CPT | Performed by: PHYSICIAN ASSISTANT
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
## 2022-07-25 ENCOUNTER — TELEPHONE (OUTPATIENT)
Dept: GASTROENTEROLOGY | Facility: MEDICAL CENTER | Age: 72
End: 2022-07-25

## 2022-07-25 NOTE — TELEPHONE ENCOUNTER
Patient called back  Rescheduled         Scheduled date of EGD (as of today) 9/2/22  Physician performing: Rogena Spatz  Location of procedure:  [facility]  Bowel prep reviewed with patient: N/A  Instructions reviewed with patient by: Elaine Herbert  Clearances: N/A

## 2022-07-25 NOTE — TELEPHONE ENCOUNTER
Left message for patient to return the call to reschedule his EGD to hospital setting due to Anesthesiologist review not appropriate to be done @ [facility]  Back line phone number given

## 2022-07-28 ENCOUNTER — TELEPHONE (OUTPATIENT)
Dept: GASTROENTEROLOGY | Facility: MEDICAL CENTER | Age: 72
End: 2022-07-28

## 2022-08-01 ENCOUNTER — TELEPHONE (OUTPATIENT)
Dept: OTHER | Facility: OTHER | Age: 72
End: 2022-08-01

## 2022-08-16 ENCOUNTER — TELEPHONE (OUTPATIENT)
Dept: GASTROENTEROLOGY | Facility: MEDICAL CENTER | Age: 72
End: 2022-08-16

## 2022-08-16 NOTE — TELEPHONE ENCOUNTER
Left voicemail and requested call back 9/28/22 appt with Prabhakar hTomas needs to be rescheduled    appt canceled at this time

## 2022-09-02 ENCOUNTER — HOSPITAL ENCOUNTER (OUTPATIENT)
Dept: GASTROENTEROLOGY | Facility: HOSPITAL | Age: 72
Setting detail: OUTPATIENT SURGERY
End: 2022-09-02
Attending: INTERNAL MEDICINE
Payer: MEDICARE

## 2022-09-02 ENCOUNTER — ANESTHESIA EVENT (OUTPATIENT)
Dept: GASTROENTEROLOGY | Facility: HOSPITAL | Age: 72
End: 2022-09-02

## 2022-09-02 ENCOUNTER — ANESTHESIA (OUTPATIENT)
Dept: GASTROENTEROLOGY | Facility: HOSPITAL | Age: 72
End: 2022-09-02

## 2022-09-02 VITALS
WEIGHT: 215 LBS | RESPIRATION RATE: 16 BRPM | TEMPERATURE: 97.6 F | DIASTOLIC BLOOD PRESSURE: 78 MMHG | OXYGEN SATURATION: 95 % | SYSTOLIC BLOOD PRESSURE: 116 MMHG | HEART RATE: 82 BPM | HEIGHT: 72 IN | BODY MASS INDEX: 29.12 KG/M2

## 2022-09-02 DIAGNOSIS — K22.70 BARRETT'S ESOPHAGUS WITHOUT DYSPLASIA: ICD-10-CM

## 2022-09-02 LAB — GLUCOSE SERPL-MCNC: 157 MG/DL (ref 65–140)

## 2022-09-02 PROCEDURE — 88342 IMHCHEM/IMCYTCHM 1ST ANTB: CPT | Performed by: PATHOLOGY

## 2022-09-02 PROCEDURE — 88341 IMHCHEM/IMCYTCHM EA ADD ANTB: CPT | Performed by: PATHOLOGY

## 2022-09-02 PROCEDURE — 82948 REAGENT STRIP/BLOOD GLUCOSE: CPT

## 2022-09-02 PROCEDURE — 88305 TISSUE EXAM BY PATHOLOGIST: CPT | Performed by: PATHOLOGY

## 2022-09-02 PROCEDURE — 43239 EGD BIOPSY SINGLE/MULTIPLE: CPT | Performed by: INTERNAL MEDICINE

## 2022-09-02 RX ORDER — LIDOCAINE HYDROCHLORIDE 20 MG/ML
INJECTION, SOLUTION EPIDURAL; INFILTRATION; INTRACAUDAL; PERINEURAL AS NEEDED
Status: DISCONTINUED | OUTPATIENT
Start: 2022-09-02 | End: 2022-09-02

## 2022-09-02 RX ORDER — SODIUM CHLORIDE 9 MG/ML
INJECTION, SOLUTION INTRAVENOUS CONTINUOUS PRN
Status: DISCONTINUED | OUTPATIENT
Start: 2022-09-02 | End: 2022-09-02

## 2022-09-02 RX ORDER — PROPOFOL 10 MG/ML
INJECTION, EMULSION INTRAVENOUS AS NEEDED
Status: DISCONTINUED | OUTPATIENT
Start: 2022-09-02 | End: 2022-09-02

## 2022-09-02 RX ADMIN — SODIUM CHLORIDE: 0.9 INJECTION, SOLUTION INTRAVENOUS at 07:48

## 2022-09-02 RX ADMIN — PROPOFOL 20 MG: 10 INJECTION, EMULSION INTRAVENOUS at 08:16

## 2022-09-02 RX ADMIN — PROPOFOL 80 MG: 10 INJECTION, EMULSION INTRAVENOUS at 08:04

## 2022-09-02 RX ADMIN — PROPOFOL 20 MG: 10 INJECTION, EMULSION INTRAVENOUS at 08:06

## 2022-09-02 RX ADMIN — PROPOFOL 20 MG: 10 INJECTION, EMULSION INTRAVENOUS at 08:14

## 2022-09-02 RX ADMIN — PROPOFOL 20 MG: 10 INJECTION, EMULSION INTRAVENOUS at 08:09

## 2022-09-02 RX ADMIN — PROPOFOL 20 MG: 10 INJECTION, EMULSION INTRAVENOUS at 08:11

## 2022-09-02 RX ADMIN — PROPOFOL 20 MG: 10 INJECTION, EMULSION INTRAVENOUS at 08:12

## 2022-09-02 RX ADMIN — LIDOCAINE HYDROCHLORIDE 100 MG: 20 INJECTION, SOLUTION EPIDURAL; INFILTRATION; INTRACAUDAL; PERINEURAL at 08:04

## 2022-09-02 NOTE — ANESTHESIA PREPROCEDURE EVALUATION
Procedure:  EGD    Relevant Problems   GI/HEPATIC   (+) Erosive esophagitis        Physical Exam    Airway       Dental   upper dentures,     Cardiovascular  Cardiovascular exam normal    Pulmonary  Pulmonary exam normal     Other Findings        Anesthesia Plan  ASA Score- 2     Anesthesia Type- IV sedation with anesthesia with ASA Monitors  Additional Monitors:   Airway Plan:           Plan Factors-    Chart reviewed  Patient summary reviewed  Patient is not a current smoker  Induction- intravenous  Postoperative Plan-     Informed Consent- Anesthetic plan and risks discussed with patient  I personally reviewed this patient with the CRNA  Discussed and agreed on the Anesthesia Plan with the CRNA  Jose Puentes is a 74 y o  male presenting today for EGD   Pertinent medical history includes: Orellana's esophagus and erosive esophagitis  History of anesthesia : no issues  NPO since last night  Denies N/V, F, chills, CP, SOB  AQA  Plan for MAC

## 2022-09-02 NOTE — ANESTHESIA POSTPROCEDURE EVALUATION
Post-Op Assessment Note    CV Status:  Stable  Pain Score: 0    Pain management: adequate     Mental Status:  Awake and sleepy   Hydration Status:  Euvolemic   PONV Controlled:  Controlled   Airway Patency:  Patent      Post Op Vitals Reviewed: Yes      Staff: Anesthesiologist, CRNA         No complications documented      /68 (09/02/22 0821)    Temp 97 6 °F (36 4 °C) (09/02/22 0821)    Pulse 85 (09/02/22 0821)   Resp 16 (09/02/22 0821)    SpO2 95 % (09/02/22 0821)
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
## 2022-09-09 ENCOUNTER — PREP FOR PROCEDURE (OUTPATIENT)
Dept: GASTROENTEROLOGY | Facility: CLINIC | Age: 72
End: 2022-09-09

## 2022-09-09 DIAGNOSIS — K22.711 BARRETT'S ESOPHAGUS WITH HIGH GRADE DYSPLASIA: Primary | ICD-10-CM

## 2022-09-09 PROCEDURE — 88305 TISSUE EXAM BY PATHOLOGIST: CPT | Performed by: PATHOLOGY

## 2022-09-09 PROCEDURE — 88341 IMHCHEM/IMCYTCHM EA ADD ANTB: CPT | Performed by: PATHOLOGY

## 2022-09-09 PROCEDURE — 88342 IMHCHEM/IMCYTCHM 1ST ANTB: CPT | Performed by: PATHOLOGY

## 2022-09-15 ENCOUNTER — TELEPHONE (OUTPATIENT)
Dept: GASTROENTEROLOGY | Facility: CLINIC | Age: 72
End: 2022-09-15

## 2022-09-15 NOTE — TELEPHONE ENCOUNTER
----- Message from Diony Hernandes sent at 9/14/2022  2:58 PM EDT -----  Regarding: please schedule    ----- Message -----  From: Genesis Schreiber DO  Sent: 9/9/2022  12:48 PM EDT  To: , #    Called patient and informed of path findings  Biopsies at 39 cm shows high-grade dysplasia  Advised to continue omeprazole BID and will schedule for repeat EGD with RFA vs EMR in the next few weeks  Staff - Please contact patient and schedule EGD with RFA vs EMR in the next 4-8 weeks with Dr Sam Meckel  I have placed order for the procedure  All questions and concerns addressed to patient's satisfaction  KILO Pendleton  Chief Gastroenterology Fellow  [facility]  Available on [email or web address]

## 2022-09-15 NOTE — TELEPHONE ENCOUNTER
LMOM for pt to callback directly to schedule EGD w/ RFA vs EMR  Offered Friday, 10/21/22, at [place] with Dr Joanna Staton

## 2022-09-16 ENCOUNTER — TRANSCRIBE ORDERS (OUTPATIENT)
Dept: GASTROENTEROLOGY | Facility: CLINIC | Age: 72
End: 2022-09-16

## 2022-09-19 ENCOUNTER — TELEPHONE (OUTPATIENT)
Dept: GASTROENTEROLOGY | Facility: CLINIC | Age: 72
End: 2022-09-19

## 2022-09-19 NOTE — TELEPHONE ENCOUNTER
LMOM for pt to callback directly to schedule EGD w/ RFA  Offered 10/19/22 at [place] with Dr Lashonda Gutierrez

## 2022-10-13 NOTE — TELEPHONE ENCOUNTER
LMOM for pt to callback directly to schedule EGD/RFA  Offered 11/23/22, at [place] with Dr Rand Mcwilliams

## 2023-12-04 ENCOUNTER — TELEPHONE (OUTPATIENT)
Dept: GASTROENTEROLOGY | Facility: CLINIC | Age: 73
End: 2023-12-04

## 2023-12-04 ENCOUNTER — TRANSCRIBE ORDERS (OUTPATIENT)
Dept: GASTROENTEROLOGY | Facility: CLINIC | Age: 73
End: 2023-12-04

## 2023-12-19 ENCOUNTER — TRANSCRIBE ORDERS (OUTPATIENT)
Dept: GASTROENTEROLOGY | Facility: CLINIC | Age: 73
End: 2023-12-19

## 2024-02-21 PROBLEM — Z12.11 COLON CANCER SCREENING: Status: RESOLVED | Noted: 2019-01-14 | Resolved: 2024-02-21

## 2024-03-12 ENCOUNTER — OFFICE VISIT (OUTPATIENT)
Dept: GASTROENTEROLOGY | Facility: MEDICAL CENTER | Age: 74
End: 2024-03-12
Payer: MEDICARE

## 2024-03-12 VITALS
HEIGHT: 72 IN | DIASTOLIC BLOOD PRESSURE: 80 MMHG | OXYGEN SATURATION: 98 % | WEIGHT: 216.3 LBS | SYSTOLIC BLOOD PRESSURE: 128 MMHG | TEMPERATURE: 97.6 F | BODY MASS INDEX: 29.3 KG/M2 | HEART RATE: 93 BPM

## 2024-03-12 DIAGNOSIS — Z86.010 PERSONAL HISTORY OF COLONIC POLYPS: ICD-10-CM

## 2024-03-12 DIAGNOSIS — K21.9 GASTROESOPHAGEAL REFLUX DISEASE WITHOUT ESOPHAGITIS: ICD-10-CM

## 2024-03-12 DIAGNOSIS — K74.60 CIRRHOSIS OF LIVER WITHOUT ASCITES, UNSPECIFIED HEPATIC CIRRHOSIS TYPE (HCC): ICD-10-CM

## 2024-03-12 DIAGNOSIS — K22.70 BARRETT'S ESOPHAGUS WITHOUT DYSPLASIA: Primary | ICD-10-CM

## 2024-03-12 PROCEDURE — 99214 OFFICE O/P EST MOD 30 MIN: CPT | Performed by: PHYSICIAN ASSISTANT

## 2024-03-12 RX ORDER — POLYETHYLENE GLYCOL 3350 17 G/17G
POWDER, FOR SOLUTION ORAL
Qty: 238 G | Refills: 0 | Status: SHIPPED | OUTPATIENT
Start: 2024-03-12

## 2024-03-12 RX ORDER — BISACODYL 5 MG/1
TABLET, DELAYED RELEASE ORAL
Qty: 4 TABLET | Refills: 0 | Status: SHIPPED | OUTPATIENT
Start: 2024-03-12
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
## 2024-03-15 ENCOUNTER — TELEPHONE (OUTPATIENT)
Age: 74
End: 2024-03-15